# Patient Record
(demographics unavailable — no encounter records)

---

## 2024-10-22 NOTE — ASSESSMENT
[FreeTextEntry1] : fever, chills, abdominal pain: Discussed CT abdomen/pelvis with contrast to rule out abdominal abscess. Check blood cultures, CBC, CMP, ucx. Treatment based on results.

## 2024-10-22 NOTE — REVIEW OF SYSTEMS
[Chest Pain] : no chest pain [Palpitations] : no palpitations [Lower Ext Edema] : no lower extremity edema [Shortness Of Breath] : no shortness of breath [Wheezing] : no wheezing [Dyspnea on Exertion] : no dyspnea on exertion [Dysuria] : no dysuria

## 2024-10-22 NOTE — HISTORY OF PRESENT ILLNESS
[FreeTextEntry8] : 30F presents for intermittent fevers and 3 months of abdominal pain. The abdominal pain is intermittent. Reports fevers up to 102. Denies sore throat, cough, ear pain. Has had N/V. Denies melena, hematochezia, dysuria.

## 2025-01-30 NOTE — PHYSICAL EXAM
[Normal] : affect was normal and insight and judgment were intact [de-identified] : mild tenderness mid to lower para thoracic muscles

## 2025-01-30 NOTE — HISTORY OF PRESENT ILLNESS
[FreeTextEntry8] : cc: back pain  c/o back pain for a week. Started when she started exercising and dieting. It is mid back and radiates to right upper abdomen / lower rib area. No change with eating and did have gallbladder out in 2021. Pain is similar to when she had gallbladder pain. Feels worse when she sleeps on her right side. It is a sharp pain. She stopped exercising. Most of the time she has constipation problems. Not new.

## 2025-01-30 NOTE — ASSESSMENT
[FreeTextEntry1] : abdominal and back pain check labs  fatty liver has hepatology appt  vitamin D deficiency  taking vit D supplement  constipation  f/u with GI

## 2025-01-30 NOTE — HEALTH RISK ASSESSMENT
[No] : In the past 12 months have you used drugs other than those required for medical reasons? No [Little interest or pleasure doing things] : 1) Little interest or pleasure doing things [Feeling down, depressed, or hopeless] : 2) Feeling down, depressed, or hopeless [0] : 2) Feeling down, depressed, or hopeless: Not at all (0) [PHQ-2 Negative - No further assessment needed] : PHQ-2 Negative - No further assessment needed [[id number]] : 0 [Never] : Never

## 2025-02-06 NOTE — ASSESSMENT
[FreeTextEntry1] : # Metabolic dysfunction-associated steatotic liver disease (MASLD): - Based on today's FibroScan, she has no significant hepatic fibrosis (F0-1) and mild steatosis. Recent CT abdomen/pelvis (10/30/24) showed hepatic steatosis. - Given that she has now had multiple reassuring FibroScans not suggesting progressive fibrosis, we will plan for next elastography surveillance with FibroScan in 2 years (~2/2027). - Given that she is already at a healthy BMI, we discussed focusing on healthy diet and exercise changes rather than losing weight/achieving a goal weight. We discussed that the best way for her to incorporate healthy lifestyle changes that were discussed in her previous appointments. - I recommended coffee consumption (up to 3-4 cups/day if desired), adherence to a Mediterranean style diet with increased consumption of vegetables and lean proteins, avoidance of red meat and high fructose corn syrup, and avoidance of calorie-containing beverages. We have discussed the plate method for re-balancing the diet, and we discussed that with adjusting portions she can still enjoy many of her traditional [place] foods though may need to change some preparations. She is being referred to dietician Lucretia Malave for further dietary consultation as per her request. - We discussed that I do not recommend vitamin E, GLP-1 RA therapy, or resmetirom at this time based on her FibroScan results (risks outweigh benefits).  # Health Maintenance - She is HAV and HBV immune. - Ms. LUIS DENNISON was counseled to abstain from alcohol; avoid use of herbal and dietary supplements due to potential hepatotoxicity; limit use of acetaminophen to <2 grams per day  Next follow-up: 2 years with FibroScan

## 2025-02-06 NOTE — PHYSICAL EXAM
[Non-Tender] : non-tender [General Appearance - Alert] : alert [General Appearance - In No Acute Distress] : in no acute distress [General Appearance - Well Nourished] : well nourished [Sclera] : the sclera and conjunctiva were normal [Hearing Threshold Finger Rub Not [place]] : hearing was normal [Oropharynx] : the oropharynx was normal [Neck Appearance] : the appearance of the neck was normal [Neck Cervical Mass (___cm)] : no neck mass was observed [Jugular Venous Distention Increased] : there was no jugular-venous distention [Respiration, Rhythm And Depth] : normal respiratory rhythm and effort [Exaggerated Use Of Accessory Muscles For Inspiration] : no accessory muscle use [Auscultation Breath Sounds / Voice Sounds] : lungs were clear to auscultation bilaterally [Heart Rate And Rhythm] : heart rate was normal and rhythm regular [Heart Sounds] : normal S1 and S2 [Murmurs] : no murmurs [Edema] : there was no peripheral edema [Abnormal Walk] : normal gait [Skin Color & Pigmentation] : normal skin color and pigmentation [Oriented To Time, Place, And Person] : oriented to person, place, and time [Impaired Insight] : insight and judgment were intact [Affect] : the affect was normal [Smooth] : smooth [General Appearance - Well Developed] : well developed [General Appearance - Well-Appearing] : healthy appearing [Bowel Sounds] : normal bowel sounds [Abdomen Soft] : soft [Abdomen Tenderness] : non-tender [Abdomen Mass (___ Cm)] : no abdominal mass palpated [Nail Clubbing] : no clubbing  or cyanosis of the fingernails [Involuntary Movements] : no involuntary movements were seen [Skin Turgor] : normal skin turgor [] : no rash [Mood] : the mood was normal [Memory Recent] : recent memory was not impaired [Memory Remote] : remote memory was not impaired [Scleral Icterus] : No Scleral Icterus [Spider Angioma] : No spider angioma(s) were observed [Abdominal  Ascites] : no ascites [Splenomegaly] : no splenomegaly [Asterixis] : no asterixis observed [Jaundice] : No jaundice [Palmar Erythema] : no Palmar Erythema

## 2025-02-06 NOTE — HISTORY OF PRESENT ILLNESS
[FreeTextEntry1] : Ms. Mojica is a very pleasant 31 yo British F with a history of choledocholithiasis (s/p ERCP 1/8/21 complicated by mild post-ERCP pancreatitis and s/p laparoscopic cholecystectomy 1/12/21), history of H pylori gastritis (2/2021, s/p triple therapy with confirmed eradication), history of preeclampsia (2020), and metabolic dysfunction-associated steatotic liver disease (MASLD).  FibroScan (5/31/22): Median liver stiffness 2.9 kPA (consistent with F0-F1 fibrosis) and CAP score 302 dB/m (consistent with S1 steatosis).   FibroScan (2/2/23): Median liver stiffness 2.7 kPA (consistent with F0-F1 fibrosis) and CAP score 307 dB/m (consistent with S1 steatosis).   FibroScan (2/5/24): Median liver stiffness 4.6 kPA (consistent with F0-F1 fibrosis) and CAP score 299 dB/m (consistent with S1 steatosis).  FibroScan (2/5/25): Median liver stiffness 4.3 kPA (consistent with F0-F1 fibrosis) and CAP score 251 dB/m (consistent with S0-1 steatosis).  She was last seen in this office on 2/5/24 (1 year ago) and is here today for routine follow-up and repeat FibroScan (above). Due to recent abdominal and back pain, she underwent CT abdomen/pelvis with contrast (10/30/24) that showed hepatic steatosis and a 2.3 cm right paraovarian cyst but no acute abnormality. She states that, overall, she is feeling well. Over the last 2-3 weeks, she has started incorporating a healthy diet and has started substituting in more whole grains. She also reports that she has started some light weight training and has been walking on her treadmill. She has many questions about the best diet to eat given her MASLD and would like to consult with a dietician.  Her mother has diabetes and MASLD. Her father is healthy. She has three siblings, all healthy. Her 3 children are healthy.  No alcohol use. Never smoker. No recreational drug use. She lives with her , their children (6 year old twin sons and 2 year old daughter), and their parents. She works part-time as a , but typically only about 20 days/year. They live in [place] in [place].

## 2025-02-06 NOTE — PHYSICAL EXAM
[Non-Tender] : non-tender [General Appearance - Alert] : alert [General Appearance - In No Acute Distress] : in no acute distress [General Appearance - Well Nourished] : well nourished [Sclera] : the sclera and conjunctiva were normal [Hearing Threshold Finger Rub Not [place]] : hearing was normal [Oropharynx] : the oropharynx was normal [Neck Appearance] : the appearance of the neck was normal [Neck Cervical Mass (___cm)] : no neck mass was observed [Jugular Venous Distention Increased] : there was no jugular-venous distention [Respiration, Rhythm And Depth] : normal respiratory rhythm and effort [Exaggerated Use Of Accessory Muscles For Inspiration] : no accessory muscle use [Auscultation Breath Sounds / Voice Sounds] : lungs were clear to auscultation bilaterally [Heart Rate And Rhythm] : heart rate was normal and rhythm regular [Heart Sounds] : normal S1 and S2 [Murmurs] : no murmurs [Edema] : there was no peripheral edema [Abnormal Walk] : normal gait [Skin Color & Pigmentation] : normal skin color and pigmentation [Oriented To Time, Place, And Person] : oriented to person, place, and time [Impaired Insight] : insight and judgment were intact [Affect] : the affect was normal [Smooth] : smooth [General Appearance - Well Developed] : well developed [General Appearance - Well-Appearing] : healthy appearing [Bowel Sounds] : normal bowel sounds [Abdomen Soft] : soft [Abdomen Tenderness] : non-tender [Abdomen Mass (___ Cm)] : no abdominal mass palpated [Nail Clubbing] : no clubbing  or cyanosis of the fingernails [Involuntary Movements] : no involuntary movements were seen [Skin Turgor] : normal skin turgor [] : no rash [Mood] : the mood was normal [Memory Recent] : recent memory was not impaired [Memory Remote] : remote memory was not impaired [Scleral Icterus] : No Scleral Icterus [Spider Angioma] : No spider angioma(s) were observed [Abdominal  Ascites] : no ascites [Splenomegaly] : no splenomegaly [Asterixis] : no asterixis observed [Jaundice] : No jaundice [Palmar Erythema] : no Palmar Erythema

## 2025-02-06 NOTE — ASSESSMENT
[FreeTextEntry1] : # Metabolic dysfunction-associated steatotic liver disease (MASLD): - Based on today's FibroScan, she has no significant hepatic fibrosis (F0-1) and mild steatosis. Recent CT abdomen/pelvis (10/30/24) showed hepatic steatosis. - Given that she has now had multiple reassuring FibroScans not suggesting progressive fibrosis, we will plan for next elastography surveillance with FibroScan in 2 years (~2/2027). - Given that she is already at a healthy BMI, we discussed focusing on healthy diet and exercise changes rather than losing weight/achieving a goal weight. We discussed that the best way for her to incorporate healthy lifestyle changes that were discussed in her previous appointments. - I recommended coffee consumption (up to 3-4 cups/day if desired), adherence to a Mediterranean style diet with increased consumption of vegetables and lean proteins, avoidance of red meat and high fructose corn syrup, and avoidance of calorie-containing beverages. We have discussed the plate method for re-balancing the diet, and we discussed that with adjusting portions she can still enjoy many of her traditional [place] foods though may need to change some preparations. She is being referred to dietician Lucretia Malave for further dietary consultation as per her request. - We discussed that I do not recommend vitamin E, GLP-1 RA therapy, or resmetirom at this time based on her FibroScan results (risks outweigh benefits).  # Health Maintenance - She is HAV and HBV immune. - Ms. LUIS DENNISON was counseled to abstain from alcohol; avoid use of herbal and dietary supplements due to potential hepatotoxicity; limit use of acetaminophen to <2 grams per day  Next follow-up: 2 years with FibroScan

## 2025-02-06 NOTE — HISTORY OF PRESENT ILLNESS
[FreeTextEntry1] : Ms. Mojica is a very pleasant 31 yo [place]ien F with a history of choledocholithiasis (s/p ERCP 1/8/21 complicated by mild post-ERCP pancreatitis and s/p laparoscopic cholecystectomy 1/12/21), history of H pylori gastritis (2/2021, s/p triple therapy with confirmed eradication), history of preeclampsia (2020), and metabolic dysfunction-associated steatotic liver disease (MASLD).  FibroScan (5/31/22): Median liver stiffness 2.9 kPA (consistent with F0-F1 fibrosis) and CAP score 302 dB/m (consistent with S1 steatosis).   FibroScan (2/2/23): Median liver stiffness 2.7 kPA (consistent with F0-F1 fibrosis) and CAP score 307 dB/m (consistent with S1 steatosis).   FibroScan (2/5/24): Median liver stiffness 4.6 kPA (consistent with F0-F1 fibrosis) and CAP score 299 dB/m (consistent with S1 steatosis).  FibroScan (2/5/25): Median liver stiffness 4.3 kPA (consistent with F0-F1 fibrosis) and CAP score 251 dB/m (consistent with S0-1 steatosis).  She was last seen in this office on 2/5/24 (1 year ago) and is here today for routine follow-up and repeat FibroScan (above). Due to recent abdominal and back pain, she underwent CT abdomen/pelvis with contrast (10/30/24) that showed hepatic steatosis and a 2.3 cm right paraovarian cyst but no acute abnormality. She states that, overall, she is feeling well. Over the last 2-3 weeks, she has started incorporating a healthy diet and has started substituting in more whole grains. She also reports that she has started some light weight training and has been walking on her treadmill. She has many questions about the best diet to eat given her MASLD and would like to consult with a dietician.  Her mother has diabetes and MASLD. Her father is healthy. She has three siblings, all healthy. Her 3 children are healthy.  No alcohol use. Never smoker. No recreational drug use. She lives with her , their children (6 year old twin sons and 2 year old daughter), and their parents. She works part-time as a , but typically only about 20 days/year. They live in [place] in [place].

## 2025-06-04 NOTE — ASSESSMENT
[FreeTextEntry1] : left arm weakness, eyelid twitching: Discussed MRI head and neck to rule out mass.  Will discuss results.  HCM: Check labs. Up to date on pap smear.  Constipation: Recommended increased fiber and fluids. Referred to [facility] for possible colonoscopy.   [Vaccines Reviewed] : Immunizations reviewed today. Please see immunization details in the vaccine log within the immunization flowsheet.

## 2025-06-04 NOTE — HEALTH RISK ASSESSMENT
[Good] : ~his/her~  mood as  good [No] : In the past 12 months have you used drugs other than those required for medical reasons? No [0] : 2) Feeling down, depressed, or hopeless: Not at all (0) [PHQ-2 Negative - No further assessment needed] : PHQ-2 Negative - No further assessment needed [[id number]] : 0 [Never] : Never [Change in mental status noted] : No change in mental status noted

## 2025-06-04 NOTE — HISTORY OF PRESENT ILLNESS
[FreeTextEntry1] : CPE [de-identified] : 31F presents for CPE and for 1 year of intermittent right eyelid twitching and left arm weakness. Also reports numbness in left arm when it occurs. Resolves on its own. Denies headache, double vision, incontinence.

## 2025-06-04 NOTE — PHYSICAL EXAM
[No Acute Distress] : no acute distress [Normal Sclera/Conjunctiva] : normal sclera/conjunctiva [PERRL] : pupils equal round and reactive to light [EOMI] : extraocular movements intact [Normal Oropharynx] : the oropharynx was normal [Normal TMs] : both tympanic membranes were normal [No Lymphadenopathy] : no lymphadenopathy [Supple] : supple [Thyroid Normal, No Nodules] : the thyroid was normal and there were no nodules present [No Respiratory Distress] : no respiratory distress  [No Accessory Muscle Use] : no accessory muscle use [Clear to Auscultation] : lungs were clear to auscultation bilaterally [Normal Rate] : normal rate  [Regular Rhythm] : with a regular rhythm [Normal S1, S2] : normal S1 and S2 [No Murmur] : no murmur heard [No Edema] : there was no peripheral edema [Soft] : abdomen soft [Non Tender] : non-tender [Non-distended] : non-distended [Normal Bowel Sounds] : normal bowel sounds [Normal Posterior Cervical Nodes] : no posterior cervical lymphadenopathy [Normal Anterior Cervical Nodes] : no anterior cervical lymphadenopathy [No Spinal Tenderness] : no spinal tenderness [Grossly Normal Strength/Tone] : grossly normal strength/tone [No Focal Deficits] : no focal deficits [Normal Gait] : normal gait [Deep Tendon Reflexes (DTR)] : deep tendon reflexes were 2+ and symmetric [Speech Grossly Normal] : speech grossly normal [Normal Affect] : the affect was normal [Alert and Oriented x3] : oriented to person, place, and time [Normal Mood] : the mood was normal [Normal Insight/Judgement] : insight and judgment were intact

## 2025-06-04 NOTE — REVIEW OF SYSTEMS
[Fever] : no fever [Chills] : no chills [Night Sweats] : no night sweats [Discharge] : no discharge [Vision Problems] : no vision problems [Itching] : no itching [Earache] : no earache [Nasal Discharge] : no nasal discharge [Sore Throat] : no sore throat [Chest Pain] : no chest pain [Palpitations] : no palpitations [Lower Ext Edema] : no lower extremity edema [Shortness Of Breath] : no shortness of breath [Wheezing] : no wheezing [Cough] : no cough [Dyspnea on Exertion] : no dyspnea on exertion [Abdominal Pain] : no abdominal pain [Nausea] : no nausea [Constipation] : constipation [Diarrhea] : diarrhea [Vomiting] : no vomiting [Melena] : no melena [Dysuria] : no dysuria [Muscle Weakness] : no muscle weakness [Muscle Pain] : no muscle pain [Skin Rash] : no skin rash [Headache] : no headache [Dizziness] : no dizziness [Suicidal] : not suicidal [Anxiety] : no anxiety [Depression] : no depression [Easy Bleeding] : no easy bleeding [Easy Bruising] : no easy bruising [Swollen Glands] : no swollen glands

## 2025-07-27 NOTE — HISTORY OF PRESENT ILLNESS
[FreeTextEntry1] : Patient referred for Constipation x 2 years Previously seen for choledocholithiasis s/p removal by ERCP  Feeling of incomplete emptying Taking Fiber and Senna PRN, not daily.  Previously had alternating constipation and diarrhea, thought due to irritable bowel syndrome.  No fevers, chills, sweats, abdominal pain, heartburn, dysphagia, nausea, vomiting, diarrhea, melena, hematochezia, jaundice or weight loss.  Labs June 2025:  Normal CBC/LFTs.

## 2025-07-27 NOTE — CONSULT LETTER
[Dear  ___] : Dear  [unfilled], [Consult Letter:] : I had the pleasure of evaluating your patient, [unfilled]. [Please see my note below.] : Please see my note below. [Consult Closing:] : Thank you very much for allowing me to participate in the care of this patient.  If you have any questions, please do not hesitate to contact me. [Sincerely,] : Sincerely, [FreeTextEntry3] : Elias Atwood MD, MPH, ANDREW, VALENTINOG Chief of Clinical Quality in Gastroenterology, [facility] Associate Chief of Gastroenterology, [facility] Interim Director of Endoscopy, [address], [address] [facility], [id number] 24 hours [phone]

## 2025-07-27 NOTE — ASSESSMENT
[FreeTextEntry1] : Impression:  #1. Constipation/feeling of incomplete emptying.  Prior altered bowel habits, constipation/diarrhea.  #2.  History of Choledocholithiasis, status post removal by ERCP/sphincterotomy. Post ERCP pancreatitis.  Status post cholecystectomy  #3. Abnormal LFTs, Include but not resolved at time of hospital discharge, now normalized in 2025.  #4. Epigastric pain, not likely pancreaticobiliary based on LFTs/pancreas enzymes.  Had EGD with ERCP in Jan 2021, gastric erythema biopsied, H.pylori positive.  #5. H.pylori gastritis, Treated with clarithromycin based triple therapy, confirmed eradicated.  Plan:  #1.  Fiber supplementation with Benefiber x 1 month trial  #2.  Laxatives if insufficient effect, suggest Miralax, titrate to adequate effect.  #3.  No need for colonoscopy at this time, no alarm symptoms/signs, no family history of colorectal cancer.

## 2025-07-27 NOTE — REVIEW OF SYSTEMS
[Constipation] : constipation [Abdominal Pain] : no abdominal pain [FreeTextEntry7] : Takes fiber and senna and has daily bowel movements

## 2025-07-27 NOTE — CONSULT LETTER
[Dear  ___] : Dear  [unfilled], [Consult Letter:] : I had the pleasure of evaluating your patient, [unfilled]. [Please see my note below.] : Please see my note below. [Consult Closing:] : Thank you very much for allowing me to participate in the care of this patient.  If you have any questions, please do not hesitate to contact me. [Sincerely,] : Sincerely, [FreeTextEntry3] : Elias Atwood MD, MPH, ANDREW, VALENTINOG Chief of Clinical Quality in Gastroenterology, [facility] Associate Chief of Gastroenterology, [facility] Interim Director of Endoscopy, [address], [address] [facility], [address] 24 hours [phone]

## 2025-07-27 NOTE — CONSULT LETTER
[Dear  ___] : Dear  [unfilled], [Consult Letter:] : I had the pleasure of evaluating your patient, [unfilled]. [Please see my note below.] : Please see my note below. [Consult Closing:] : Thank you very much for allowing me to participate in the care of this patient.  If you have any questions, please do not hesitate to contact me. [Sincerely,] : Sincerely, [FreeTextEntry3] : Elias Atwood MD, MPH, ANDREW, VAELNTINOG Chief of Clinical Quality in Gastroenterology, [facility] Associate Chief of Gastroenterology, [facility] Interim Director of Endoscopy, [address], [address] [facility], [address] 24 hours [phone]

## 2025-07-29 NOTE — CONSULT LETTER
[Dear  ___] : Dear  [unfilled], [Courtesy Letter:] : I had the pleasure of seeing your patient, [unfilled], in my office today. [Sincerely,] : Sincerely, [FreeTextEntry2] : Kevin Bales MD [address] [place] [address] [address],  [place] [id number] [FreeTextEntry3] : Manuelito Dillard MD, PhD, FRCPSC   Attending Neurosurgeon   of Neurosurgery  [facility]  [address] [facility], [address]nd [place] [address]  Office: [phone]  Fax: [phone]

## 2025-07-29 NOTE — CONSULT LETTER
[Dear  ___] : Dear  [unfilled], [Courtesy Letter:] : I had the pleasure of seeing your patient, [unfilled], in my office today. [Sincerely,] : Sincerely, [FreeTextEntry2] : Kevin Bales MD [address] [place] [address] [address],  [place] [phone] [FreeTextEntry3] : Manuelito Dillard MD, PhD, FRCPSC   Attending Neurosurgeon   of Neurosurgery  [facility]  [address] [facility], [address]nd [place] [address]  Office: [phone]  Fax: [phone]